# Patient Record
Sex: MALE | Race: WHITE | NOT HISPANIC OR LATINO | Employment: UNEMPLOYED | URBAN - METROPOLITAN AREA
[De-identification: names, ages, dates, MRNs, and addresses within clinical notes are randomized per-mention and may not be internally consistent; named-entity substitution may affect disease eponyms.]

---

## 2017-01-03 RX ORDER — TESTOSTERONE CYPIONATE 200 MG/ML
INJECTION, SOLUTION INTRAMUSCULAR
Qty: 2 ML | Refills: 0 | OUTPATIENT
Start: 2017-01-03 | End: 2017-04-04 | Stop reason: SDUPTHER

## 2017-01-03 NOTE — TELEPHONE ENCOUNTER
----- Message from Alyssa Diaz sent at 1/3/2017  1:10 PM EST -----  Contact: patient  Patient needs a refill of testosterone sent to [facility], and syringes, 30 day supply    Patient also needs a refill of Somatropin 20 MG/2ML solution to a mail order pharmacy    Patient can be reached at his home phone number      [facility] WAS FAXED FOR SOMATROPIN AND CALLED IN TESTOSTERONE

## 2017-01-31 DIAGNOSIS — D35.2 BENIGN NEOPLASM OF PITUITARY GLAND (HCC): ICD-10-CM

## 2017-01-31 DIAGNOSIS — E27.40 CHRONIC ADRENAL INSUFFICIENCY (HCC): ICD-10-CM

## 2017-01-31 DIAGNOSIS — E27.9 DISORDER OF ADRENAL GLAND (HCC): Primary | ICD-10-CM

## 2017-02-02 ENCOUNTER — TELEPHONE (OUTPATIENT)
Dept: ENDOCRINOLOGY | Age: 32
End: 2017-02-02

## 2017-02-02 DIAGNOSIS — E27.40 CHRONIC ADRENAL INSUFFICIENCY (HCC): ICD-10-CM

## 2017-02-02 DIAGNOSIS — E29.1 HYPOGONADISM IN MALE: ICD-10-CM

## 2017-02-02 DIAGNOSIS — D35.2 BENIGN NEOPLASM OF PITUITARY GLAND (HCC): ICD-10-CM

## 2017-02-02 DIAGNOSIS — E03.8 OTHER SPECIFIED HYPOTHYROIDISM: ICD-10-CM

## 2017-02-02 DIAGNOSIS — E27.9 DISORDER OF ADRENAL GLAND (HCC): Primary | ICD-10-CM

## 2017-02-02 NOTE — TELEPHONE ENCOUNTER
----- Message from Melva Allen sent at 2/2/2017  2:16 PM EST -----  Contact: [facility]  [phone]  [id number]  Somatropin 20 MG/2ML solution 24 mL 1 1/31/2017       Inject 0.4 mg under the skin Daily    Insurance wont pay for 6 month supply  24 ml equivalent to 6 months    She can break down to 90 days with 3 refills    Please call pharmacy          Script sent for 90 day supply

## 2017-03-06 ENCOUNTER — TELEPHONE (OUTPATIENT)
Dept: ENDOCRINOLOGY | Age: 32
End: 2017-03-06

## 2017-03-06 NOTE — TELEPHONE ENCOUNTER
Sent a pa via cover my meds for Nutropin, also called into [facility] for the pa.

## 2017-03-29 RX ORDER — TESTOSTERONE CYPIONATE 200 MG/ML
INJECTION, SOLUTION INTRAMUSCULAR
Refills: 0 | Status: CANCELLED | OUTPATIENT
Start: 2017-03-29

## 2017-04-04 RX ORDER — TESTOSTERONE CYPIONATE 200 MG/ML
INJECTION, SOLUTION INTRAMUSCULAR
Qty: 2 ML | Refills: 0 | OUTPATIENT
Start: 2017-04-04 | End: 2017-04-26 | Stop reason: SDUPTHER

## 2017-04-28 PROBLEM — E23.0 HYPOPITUITARISM (HCC): Status: ACTIVE | Noted: 2017-04-28

## 2017-05-03 RX ORDER — TESTOSTERONE CYPIONATE 200 MG/ML
INJECTION, SOLUTION INTRAMUSCULAR
Qty: 2 ML | Refills: 1 | Status: SHIPPED | OUTPATIENT
Start: 2017-05-03 | End: 2017-07-11 | Stop reason: SDUPTHER

## 2017-05-11 ENCOUNTER — TELEPHONE (OUTPATIENT)
Dept: ENDOCRINOLOGY | Age: 32
End: 2017-05-11

## 2017-07-13 ENCOUNTER — TELEPHONE (OUTPATIENT)
Dept: ENDOCRINOLOGY | Age: 32
End: 2017-07-13

## 2017-07-13 RX ORDER — TESTOSTERONE CYPIONATE 200 MG/ML
INJECTION, SOLUTION INTRAMUSCULAR
Qty: 2 ML | Refills: 1 | OUTPATIENT
Start: 2017-07-13 | End: 2017-10-23 | Stop reason: SDUPTHER

## 2017-07-13 NOTE — TELEPHONE ENCOUNTER
Testosterone cypionate  200 mg/mL  inject 1 Ml into shoulder thigh of buttocks every 2 weeks  qty 2mL with 1 refill was called into patient pharamcy

## 2017-11-01 RX ORDER — TESTOSTERONE CYPIONATE 200 MG/ML
INJECTION, SOLUTION INTRAMUSCULAR
Qty: 4 ML | Refills: 1 | OUTPATIENT
Start: 2017-11-01 | End: 2017-12-28 | Stop reason: SDUPTHER

## 2017-12-22 ENCOUNTER — OFFICE VISIT (OUTPATIENT)
Dept: ENDOCRINOLOGY | Age: 32
End: 2017-12-22

## 2017-12-22 VITALS
DIASTOLIC BLOOD PRESSURE: 106 MMHG | BODY MASS INDEX: 36.68 KG/M2 | WEIGHT: 295 LBS | HEART RATE: 100 BPM | SYSTOLIC BLOOD PRESSURE: 150 MMHG | HEIGHT: 75 IN

## 2017-12-22 DIAGNOSIS — D35.2 BENIGN NEOPLASM OF PITUITARY GLAND (HCC): ICD-10-CM

## 2017-12-22 DIAGNOSIS — E27.40 CHRONIC ADRENAL INSUFFICIENCY (HCC): ICD-10-CM

## 2017-12-22 DIAGNOSIS — E29.1 HYPOGONADISM IN MALE: ICD-10-CM

## 2017-12-22 DIAGNOSIS — E03.8 OTHER SPECIFIED HYPOTHYROIDISM: ICD-10-CM

## 2017-12-22 DIAGNOSIS — R63.5 ABNORMAL WEIGHT GAIN: ICD-10-CM

## 2017-12-22 DIAGNOSIS — E23.0 HYPOPITUITARISM (HCC): Primary | ICD-10-CM

## 2017-12-22 PROCEDURE — 99214 OFFICE O/P EST MOD 30 MIN: CPT | Performed by: INTERNAL MEDICINE

## 2017-12-22 RX ORDER — FLUTICASONE PROPIONATE 110 UG/1
AEROSOL, METERED RESPIRATORY (INHALATION)
COMMUNITY
Start: 2017-11-29

## 2017-12-22 NOTE — PROGRESS NOTES
32 y.o.    Patient Care Team:  Christiano Peterson MD as PCP - General (Family Medicine)    Chief Complaint:        F/U ADRENAL INSUFFICIENCY.  HYPOTHYROIDISM.  HYPOGONADISM MALE.  PITUITARY ADENOMA.  HERE TO DISCUSS LAB RESULTS.  C/O BRAIN FOG AND CHEST DISCOMFORT TROUBLE FOCUSING.     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  He reports compliance with the medication and denies any side effects  Adrenal insufficiency  Patient takes hydrocortisone 5 mg tablet 4 tablets daily.  He reports compliance with the medication and denies any side effects.  Hypogonadism  Patient is currently on testosterone injections 1 cc every 2 weeks  He denies any side effects and reports compliance of the medication  Abnormal weight gain  Patient has not lost any weight since last visit.       Interval History  patient is a 30-year-old white male with a past medical history of pituitary tumor status post surgery approximately 12 years ago came for followup.  Hypopituitarism patient is currently on growth hormone replacement injections daily he gets 0.7 mg daily subcutaneous injection self-administered.  Hypothyroidism patient is receiving 75 mcg levothyroxin daily.  Hypogonadism patient is on testosterone injections 200 mg intramuscular  every 2 weeks. He appears to be tolerating the medication well has no side effects reported.  Abnormal weight gain. Patient is currently taking metformin 500 mg twice daily and again denied any side effects.  Patient had an MRI last year and was seen by neurosurgery and was told that everything is fine and he needs a followup MRI sometime this year. Patient is unable to schedule this for high co-pay.  he was also advised to change his eyeglasses which he did and reports that he had significant improvement in his headache and vision problems  Patient reported that he had a colonoscopy within the past 2-3 years and apparently was normal. He reported family history of colon polyps.  I recommend that he get another colonoscopy within the next year.  Patient denied any side effects to medication, he also denied any back pain or hip pain.  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  Patient reported that he started low-carb diet approximately 6 months ago and since then managed to lose nearly 26 pounds        The following portions of the patient's history were reviewed and updated as appropriate: allergies, current medications, past family history, past medical history, past social history, past surgical history and problem list.    Past Medical History:   Diagnosis Date   • Adrenal insufficiency    • Asthma    • Hypogonadism in male    • Hypothyroidism    • Pituitary adenoma      Family History   Problem Relation Age of Onset   • Thyroid disease Father      Social History     Social History   • Marital status: Single     Spouse name: N/A   • Number of children: N/A   • Years of education: N/A     Occupational History   • Not on file.     Social History Main Topics   • Smoking status: Never Smoker   • Smokeless tobacco: Not on file   • Alcohol use Yes      Comment: SOCIAL   • Drug use: No   • Sexual activity: Not on file     Other Topics Concern   • Not on file     Social History  "Narrative     Allergies   Allergen Reactions   • Other        Current Outpatient Prescriptions:   •  hydrocortisone (CORTEF) 5 MG tablet, Take 1 tablet by mouth 4 (Four) Times a Day., Disp: 120 tablet, Rfl: 10  •  levothyroxine (SYNTHROID, LEVOTHROID) 75 MCG tablet, Take 1 tablet by mouth Daily., Disp: 30 tablet, Rfl: 10  •  meloxicam (MOBIC) 15 MG tablet, , Disp: , Rfl:   •  metFORMIN (GLUCOPHAGE) 500 MG tablet, TAKE ONE TABLET BY MOUTH TWICE DAILY WITH MEALS, Disp: 60 tablet, Rfl: 0  •  metFORMIN (GLUCOPHAGE) 500 MG tablet, Take 1 tablet by mouth 2 (Two) Times a Day With Meals., Disp: 60 tablet, Rfl: 0  •  Somatropin (OMNITROPE) 10 MG/1.5ML solution, Inject 0.4mg under skin daily, Disp: 12 mL, Rfl: 1  •  Testosterone Cypionate (DEPOTESTOTERONE CYPIONATE) 200 MG/ML injection, INJECT 1ML INTO THE SHOULDER, THIGH OR BUTTOCKS EVERY TWO WEEKS, Disp: 4 mL, Rfl: 1  •  VENTOLIN  (90 BASE) MCG/ACT inhaler, , Disp: , Rfl:         Review of Systems   Constitutional: Positive for fatigue. Negative for chills and fever.   Cardiovascular: Negative for chest pain and palpitations.   Gastrointestinal: Negative for abdominal pain, constipation, diarrhea, nausea and vomiting.   Endocrine: Negative for cold intolerance and heat intolerance.   All other systems reviewed and are negative.      Objective       Vitals:    12/22/17 1233   BP: (!) 150/106   Pulse: 100   Weight: 134 kg (295 lb)   Height: 190.5 cm (75\")     Body mass index is 36.87 kg/(m^2).      Physical Exam   Constitutional: He is oriented to person, place, and time. He appears well-developed and well-nourished.   obese   HENT:   Head: Normocephalic and atraumatic.   Eyes: EOM are normal. Pupils are equal, round, and reactive to light.   Neck: Normal range of motion. Neck supple. No thyromegaly present.   Cardiovascular: Normal rate, regular rhythm, normal heart sounds and intact distal pulses.    Pulmonary/Chest: Effort normal and breath sounds normal. "   Abdominal: Soft. Bowel sounds are normal. He exhibits distension. There is no tenderness.   Musculoskeletal: Normal range of motion. He exhibits no edema.   Dorsal kyphosis noted   Lymphadenopathy:     He has no cervical adenopathy.   Neurological: He is alert and oriented to person, place, and time. He has normal reflexes.   Skin: Skin is warm and dry. No rash noted.   Psychiatric: He has a normal mood and affect. His behavior is normal.   Nursing note and vitals reviewed.    Results Review:     I reviewed the patient's new clinical results.    Medical records reviewed  Summary:      Office Visit on 12/23/2016   Component Date Value Ref Range Status   • Cortisol 12/23/2016 15.7  ug/dL Final    Comment:                         Cortisol AM         6.2 - 19.4                          Cortisol PM         2.3 - 11.9     • ACTH 12/23/2016 5.3* 7.2 - 63.3 pg/mL Final    ACTH reference interval for samples collected between 7 and 10 AM.   • Free T4 12/23/2016 1.03  0.93 - 1.70 ng/dL Final   • TSH 12/23/2016 1.930  0.270 - 4.200 mIU/mL Final   • Prolactin 12/23/2016 19.1* 4.0 - 15.2 ng/mL Final   • Glucose 12/23/2016 87  65 - 99 mg/dL Final   • BUN 12/23/2016 10  6 - 20 mg/dL Final   • Creatinine 12/23/2016 0.94  0.76 - 1.27 mg/dL Final   • eGFR Non  Am 12/23/2016 94  >60 mL/min/1.73 Final   • eGFR African Am 12/23/2016 114  >60 mL/min/1.73 Final   • BUN/Creatinine Ratio 12/23/2016 10.6  7.0 - 25.0 Final   • Sodium 12/23/2016 141  136 - 145 mmol/L Final   • Potassium 12/23/2016 4.3  3.5 - 5.2 mmol/L Final   • Chloride 12/23/2016 99  98 - 107 mmol/L Final   • Total CO2 12/23/2016 25.7  22.0 - 29.0 mmol/L Final   • Calcium 12/23/2016 9.7  8.6 - 10.5 mg/dL Final   • Total Protein 12/23/2016 7.1  6.0 - 8.5 g/dL Final   • Albumin 12/23/2016 4.90  3.50 - 5.20 g/dL Final   • Globulin 12/23/2016 2.2  gm/dL Final   • A/G Ratio 12/23/2016 2.2  g/dL Final   • Total Bilirubin 12/23/2016 0.6  0.1 - 1.2 mg/dL Final   • Alkaline  Phosphatase 12/23/2016 52  39 - 117 U/L Final   • AST (SGOT) 12/23/2016 21  1 - 40 U/L Final   • ALT (SGPT) 12/23/2016 25  1 - 41 U/L Final   • Testosterone, Total 12/23/2016 820  348 - 1197 ng/dL Final   • Comment 12/23/2016 Comment   Final    Comment: Adult male reference interval is based on a population of lean males  up to 40 years old.     • Testosterone, Free 12/23/2016 13.7  8.7 - 25.1 pg/mL Final   • WBC 12/23/2016 5.63  4.50 - 10.70 10*3/mm3 Final   • RBC 12/23/2016 4.97  4.60 - 6.00 10*6/mm3 Final   • Hemoglobin 12/23/2016 14.6  13.7 - 17.6 g/dL Final   • Hematocrit 12/23/2016 43.9  40.4 - 52.2 % Final   • MCV 12/23/2016 88.3  79.8 - 96.2 fL Final   • MCH 12/23/2016 29.4  27.0 - 32.7 pg Final   • MCHC 12/23/2016 33.3  32.6 - 36.4 g/dL Final   • RDW 12/23/2016 12.9  11.5 - 14.5 % Final   • Platelets 12/23/2016 208  140 - 500 10*3/mm3 Final   • Neutrophil Rel % 12/23/2016 63.2  42.7 - 76.0 % Final   • Lymphocyte Rel % 12/23/2016 21.8  19.6 - 45.3 % Final   • Monocyte Rel % 12/23/2016 10.5  5.0 - 12.0 % Final   • Eosinophil Rel % 12/23/2016 4.1  0.3 - 6.2 % Final   • Basophil Rel % 12/23/2016 0.4  0.0 - 1.5 % Final   • Neutrophils Absolute 12/23/2016 3.56  1.90 - 8.10 10*3/mm3 Final   • Lymphocytes Absolute 12/23/2016 1.23  0.90 - 4.80 10*3/mm3 Final   • Monocytes Absolute 12/23/2016 0.59  0.20 - 1.20 10*3/mm3 Final   • Eosinophils Absolute 12/23/2016 0.23  0.00 - 0.70 10*3/mm3 Final   • Basophils Absolute 12/23/2016 0.02  0.00 - 0.20 10*3/mm3 Final   • Immature Granulocyte Rel % 12/23/2016 0.0  0.0 - 0.5 % Final   • Immature Grans Absolute 12/23/2016 0.00  0.00 - 0.03 10*3/mm3 Final   • 25 Hydroxy, Vitamin D 12/23/2016 23.8* 30.0 - 100.0 ng/mL Final    Comment: Reference Range for Total Vitamin D 25(OH)  Deficiency    <20.0 ng/mL  Insufficiency 21-29 ng/mL  Sufficiency    ng/mL  Toxicity      >100 ng/ml          Lab Results   Component Value Date    HGBA1C 5.1 12/22/2014     Lab Results   Component  Value Date    CREATININE 0.94 12/23/2016     Imaging Results (most recent)     None                Assessment and Plan:    Gilbert was seen today for adrenal problem, pituitary problem, hypothyroidism and hypogonadism.    Diagnoses and all orders for this visit:    Hypopituitarism  -     T4, Free  -     TSH  -     Prolactin  -     Testosterone, Free, Total  -     Comprehensive Metabolic Panel  -     Insulin-like Growth Factor  -     Cortisol  -     ACTH    Other specified hypothyroidism  -     T4, Free  -     TSH  -     Prolactin  -     Testosterone, Free, Total  -     Comprehensive Metabolic Panel  -     Insulin-like Growth Factor  -     Cortisol  -     ACTH    Abnormal weight gain  -     T4, Free  -     TSH  -     Prolactin  -     Testosterone, Free, Total  -     Comprehensive Metabolic Panel  -     Insulin-like Growth Factor  -     Cortisol  -     ACTH    Benign neoplasm of pituitary gland  -     T4, Free  -     TSH  -     Prolactin  -     Testosterone, Free, Total  -     Comprehensive Metabolic Panel  -     Insulin-like Growth Factor  -     Cortisol  -     ACTH    Chronic adrenal insufficiency  -     T4, Free  -     TSH  -     Prolactin  -     Testosterone, Free, Total  -     Comprehensive Metabolic Panel  -     Insulin-like Growth Factor  -     Cortisol  -     ACTH    Hypogonadism in male  -     T4, Free  -     TSH  -     Prolactin  -     Testosterone, Free, Total  -     Comprehensive Metabolic Panel  -     Insulin-like Growth Factor  -     Cortisol  -     ACTH    Other orders  -     Somatropin (OMNITROPE) 10 MG/1.5ML solution; Inject 0.4mg under skin daily    #1 hypopituitarism with growth hormone deficiency. Patient is currently taking growth hormone injections 0.5 mg daily. He denies any side effects from medication.  #2 hypothyroidism: Patient is currently taking 75 mcg levothyroxine daily  #3 hypogonadism patient is on 200 mg testosterone injections every 2 weeks and tolerating the medication well . Denied  "any side effects.  #4 adrenal insufficiency: Patient is currently on hydrocortisone 10 mg in the morning, 5 mg at noon, 5 mg at 5 PM.  #5 abnormal weight gain most likely multifactorial. I advised him to continue metformin 500 mg twice a day     Patient is gained some weight since last visit  He reports that he was not getting the growth hormone injections from the specialty pharmacy for the past one year  Apparently there was some problem with the prior authorization and related paperwork  Patient reports gaining weight and feeling short of breath and feeling very fatigued for the past 6 months he also reports significant brain fog in diminishing concentration  All are known to cut in growth hormone deficiency  In his case he definitely needs the growth hormone injections  The pediatric endocrinologist has documented all the tests and the results and he has been receiving his medication ever since his childhood     Patient will get lab testing done today  He will return to follow-up in one year    The total time spent for old record and lab review and face- to- face was more than 25 min of which greater than 15 min of time was spent on counseling the patient on recommended evaluation and treatment options, instructions for management/treatment and /or follow up  and importance of compliance with chosen management or treatment options    Oli Padilla MD. FACE    12/22/17      EMR Dragon / transcription disclaimer:     \"Dictated utilizing Dragon dictation\".         "

## 2017-12-26 LAB
ACTH PLAS-MCNC: 3.6 PG/ML (ref 7.2–63.3)
ALBUMIN SERPL-MCNC: 4.8 G/DL (ref 3.5–5.2)
ALBUMIN/GLOB SERPL: 1.8 G/DL
ALP SERPL-CCNC: 56 U/L (ref 39–117)
ALT SERPL-CCNC: 14 U/L (ref 1–41)
AST SERPL-CCNC: 14 U/L (ref 1–40)
BILIRUB SERPL-MCNC: 0.4 MG/DL (ref 0.1–1.2)
BUN SERPL-MCNC: 12 MG/DL (ref 6–20)
BUN/CREAT SERPL: 12.4 (ref 7–25)
CALCIUM SERPL-MCNC: 9.6 MG/DL (ref 8.6–10.5)
CHLORIDE SERPL-SCNC: 96 MMOL/L (ref 98–107)
CO2 SERPL-SCNC: 30 MMOL/L (ref 22–29)
CORTIS SERPL-MCNC: 4.7 UG/DL
CREAT SERPL-MCNC: 0.97 MG/DL (ref 0.76–1.27)
GLOBULIN SER CALC-MCNC: 2.7 GM/DL
GLUCOSE SERPL-MCNC: 80 MG/DL (ref 65–99)
IGF-I SERPL-MCNC: 66 NG/ML (ref 88–246)
POTASSIUM SERPL-SCNC: 4.2 MMOL/L (ref 3.5–5.2)
PROLACTIN SERPL-MCNC: 14.2 NG/ML (ref 4–15.2)
PROT SERPL-MCNC: 7.5 G/DL (ref 6–8.5)
SODIUM SERPL-SCNC: 139 MMOL/L (ref 136–145)
T4 FREE SERPL-MCNC: 1.2 NG/DL (ref 0.93–1.7)
TESTOST FREE SERPL-MCNC: 22.6 PG/ML (ref 8.7–25.1)
TESTOST SERPL-MCNC: 1086 NG/DL (ref 264–916)
TSH SERPL DL<=0.005 MIU/L-ACNC: 1.58 MIU/ML (ref 0.27–4.2)

## 2017-12-28 RX ORDER — TESTOSTERONE CYPIONATE 200 MG/ML
INJECTION, SOLUTION INTRAMUSCULAR
Qty: 1 ML | Refills: 5 | OUTPATIENT
Start: 2017-12-28 | End: 2018-03-06 | Stop reason: SDUPTHER

## 2018-01-08 DIAGNOSIS — E27.9 DISORDER OF ADRENAL GLAND (HCC): ICD-10-CM

## 2018-01-08 DIAGNOSIS — D35.2 BENIGN NEOPLASM OF PITUITARY GLAND (HCC): ICD-10-CM

## 2018-01-08 RX ORDER — HYDROCORTISONE 5 MG/1
TABLET ORAL
Qty: 120 TABLET | Refills: 10 | Status: SHIPPED | OUTPATIENT
Start: 2018-01-08 | End: 2018-07-27 | Stop reason: SDUPTHER

## 2018-01-08 RX ORDER — LEVOTHYROXINE SODIUM 0.07 MG/1
TABLET ORAL
Qty: 30 TABLET | Refills: 10 | Status: SHIPPED | OUTPATIENT
Start: 2018-01-08 | End: 2018-07-27 | Stop reason: SDUPTHER

## 2018-02-16 ENCOUNTER — TELEPHONE (OUTPATIENT)
Dept: ENDOCRINOLOGY | Age: 33
End: 2018-02-16

## 2018-02-16 NOTE — TELEPHONE ENCOUNTER
SPOKE WITH [facility] ABOUT PATIENT MEDICATION  THEY STATED THEY NEEDED [facility] FILLED OUT WITH OUT ANY KIND OF MISTAKES ON IT AND FAXED BACK TO THEM.

## 2018-03-09 RX ORDER — TESTOSTERONE CYPIONATE 200 MG/ML
INJECTION, SOLUTION INTRAMUSCULAR
Qty: 2 ML | Refills: 2 | OUTPATIENT
Start: 2018-03-09 | End: 2018-06-05 | Stop reason: SDUPTHER

## 2018-06-07 NOTE — TELEPHONE ENCOUNTER
LAST OFFICE VISIT AND LABS 12/17/17  PATIENT HAS APPOINTMENT FOR 7/27/18 RESCHEDULED FROM 6/15/18    LEW 6/7/18    SCRIPT CALLED TO PHARMACY

## 2018-06-09 RX ORDER — TESTOSTERONE CYPIONATE 200 MG/ML
INJECTION, SOLUTION INTRAMUSCULAR
Qty: 2 ML | Refills: 5 | Status: SHIPPED | OUTPATIENT
Start: 2018-06-09 | End: 2018-07-27 | Stop reason: SDUPTHER

## 2018-07-27 ENCOUNTER — OFFICE VISIT (OUTPATIENT)
Dept: ENDOCRINOLOGY | Age: 33
End: 2018-07-27

## 2018-07-27 VITALS
BODY MASS INDEX: 39.17 KG/M2 | SYSTOLIC BLOOD PRESSURE: 150 MMHG | WEIGHT: 315 LBS | HEIGHT: 75 IN | HEART RATE: 99 BPM | DIASTOLIC BLOOD PRESSURE: 110 MMHG

## 2018-07-27 DIAGNOSIS — D35.2 BENIGN NEOPLASM OF PITUITARY GLAND (HCC): ICD-10-CM

## 2018-07-27 DIAGNOSIS — E03.8 OTHER SPECIFIED HYPOTHYROIDISM: ICD-10-CM

## 2018-07-27 DIAGNOSIS — E27.9 DISORDER OF ADRENAL GLAND (HCC): ICD-10-CM

## 2018-07-27 DIAGNOSIS — E23.0 HYPOPITUITARISM (HCC): Primary | ICD-10-CM

## 2018-07-27 DIAGNOSIS — E29.1 HYPOGONADISM IN MALE: ICD-10-CM

## 2018-07-27 PROCEDURE — 99214 OFFICE O/P EST MOD 30 MIN: CPT | Performed by: INTERNAL MEDICINE

## 2018-07-27 RX ORDER — VENLAFAXINE HYDROCHLORIDE 75 MG/1
CAPSULE, EXTENDED RELEASE ORAL
COMMUNITY
Start: 2018-07-09

## 2018-07-27 RX ORDER — TESTOSTERONE CYPIONATE 200 MG/ML
INJECTION, SOLUTION INTRAMUSCULAR
Qty: 2 ML | Refills: 5 | Status: SHIPPED | OUTPATIENT
Start: 2018-07-27 | End: 2019-04-02 | Stop reason: SDUPTHER

## 2018-07-27 RX ORDER — LEVOTHYROXINE SODIUM 0.07 MG/1
75 TABLET ORAL DAILY
Qty: 90 TABLET | Refills: 3 | Status: SHIPPED | OUTPATIENT
Start: 2018-07-27 | End: 2018-08-03 | Stop reason: SDUPTHER

## 2018-07-27 RX ORDER — HYDROCORTISONE 5 MG/1
TABLET ORAL
Qty: 120 TABLET | Refills: 10 | Status: SHIPPED | OUTPATIENT
Start: 2018-07-27 | End: 2018-08-03 | Stop reason: SDUPTHER

## 2018-07-27 RX ORDER — LEVOTHYROXINE SODIUM 0.07 MG/1
75 TABLET ORAL DAILY
Qty: 30 TABLET | Refills: 10 | Status: SHIPPED | OUTPATIENT
Start: 2018-07-27 | End: 2018-07-27 | Stop reason: SDUPTHER

## 2018-07-30 LAB
25(OH)D3+25(OH)D2 SERPL-MCNC: 20.9 NG/ML (ref 30–100)
ACTH PLAS-MCNC: 5.6 PG/ML (ref 7.2–63.3)
ALBUMIN SERPL-MCNC: 4.7 G/DL (ref 3.5–5.2)
ALBUMIN/GLOB SERPL: 2.1 G/DL
ALP SERPL-CCNC: 46 U/L (ref 39–117)
ALT SERPL-CCNC: 60 U/L (ref 1–41)
AST SERPL-CCNC: 40 U/L (ref 1–40)
BILIRUB SERPL-MCNC: 0.3 MG/DL (ref 0.1–1.2)
BUN SERPL-MCNC: 10 MG/DL (ref 6–20)
BUN/CREAT SERPL: 13.2 (ref 7–25)
CALCIUM SERPL-MCNC: 9.9 MG/DL (ref 8.6–10.5)
CHLORIDE SERPL-SCNC: 98 MMOL/L (ref 98–107)
CO2 SERPL-SCNC: 24 MMOL/L (ref 22–29)
CORTIS SERPL-MCNC: 2 UG/DL
CREAT SERPL-MCNC: 0.76 MG/DL (ref 0.76–1.27)
GLOBULIN SER CALC-MCNC: 2.2 GM/DL
GLUCOSE SERPL-MCNC: 96 MG/DL (ref 65–99)
IGF-I SERPL-MCNC: 107 NG/ML (ref 88–246)
POTASSIUM SERPL-SCNC: 4.3 MMOL/L (ref 3.5–5.2)
PROLACTIN SERPL-MCNC: 11.2 NG/ML (ref 4–15.2)
PROT SERPL-MCNC: 6.9 G/DL (ref 6–8.5)
SHBG SERPL-SCNC: 32.6 NMOL/L (ref 16.5–55.9)
SODIUM SERPL-SCNC: 138 MMOL/L (ref 136–145)
T4 FREE SERPL-MCNC: 0.96 NG/DL (ref 0.93–1.7)
TESTOST FREE SERPL-MCNC: 11.7 PG/ML (ref 8.7–25.1)
TESTOST SERPL-MCNC: 690 NG/DL (ref 264–916)
TSH SERPL DL<=0.005 MIU/L-ACNC: 1.32 MIU/ML (ref 0.27–4.2)

## 2018-08-03 ENCOUNTER — TELEPHONE (OUTPATIENT)
Dept: ENDOCRINOLOGY | Age: 33
End: 2018-08-03

## 2018-08-03 DIAGNOSIS — D35.2 BENIGN NEOPLASM OF PITUITARY GLAND (HCC): ICD-10-CM

## 2018-08-03 DIAGNOSIS — E27.9 DISORDER OF ADRENAL GLAND (HCC): ICD-10-CM

## 2018-08-03 RX ORDER — LEVOTHYROXINE SODIUM 0.07 MG/1
75 TABLET ORAL DAILY
Qty: 90 TABLET | Refills: 3 | Status: SHIPPED | OUTPATIENT
Start: 2018-08-03 | End: 2019-03-01 | Stop reason: SDUPTHER

## 2018-08-03 RX ORDER — HYDROCORTISONE 5 MG/1
TABLET ORAL
Qty: 120 TABLET | Refills: 10 | Status: SHIPPED | OUTPATIENT
Start: 2018-08-03 | End: 2019-03-01 | Stop reason: SDUPTHER

## 2018-08-03 NOTE — TELEPHONE ENCOUNTER
----- Message from Mayela Cannon sent at 8/3/2018  1:55 PM EDT -----  MEDICATIONS WERE SENT TO [facility].  PLEASE RESEND FOLLOWING MEDS TO [id number] - Psychiatric [address] - [phone]  - [phone] FX.    Hydrocortisone (CORTEF) 5 MG tablet 2 TAB S IN AM AND 1 AT NOON AND EVENING  Levothyroxine (SYNTHROID, LEVOTHROID) 75 MCG tablet Take 1 tablet by mouth Daily.  MetFORMIN (GLUCOPHAGE) 500 MG tablet Take 1 tablet by mouth 2 (Two) Times a Day With Meals.    BEST # FOR PT -[phone]    [facility] SENT

## 2019-01-25 ENCOUNTER — RESULTS ENCOUNTER (OUTPATIENT)
Dept: ENDOCRINOLOGY | Age: 34
End: 2019-01-25

## 2019-01-25 DIAGNOSIS — E27.9 DISORDER OF ADRENAL GLAND (HCC): ICD-10-CM

## 2019-01-25 DIAGNOSIS — E03.8 OTHER SPECIFIED HYPOTHYROIDISM: ICD-10-CM

## 2019-01-25 DIAGNOSIS — E29.1 HYPOGONADISM IN MALE: ICD-10-CM

## 2019-01-25 DIAGNOSIS — E23.0 HYPOPITUITARISM (HCC): ICD-10-CM

## 2019-01-25 DIAGNOSIS — D35.2 BENIGN NEOPLASM OF PITUITARY GLAND (HCC): ICD-10-CM

## 2019-01-25 DIAGNOSIS — D35.2 BENIGN NEOPLASM OF PITUITARY GLAND (HCC): Primary | ICD-10-CM

## 2019-02-14 RX ORDER — TESTOSTERONE CYPIONATE 200 MG/ML
INJECTION, SOLUTION INTRAMUSCULAR
Refills: 1 | OUTPATIENT
Start: 2019-02-14

## 2019-02-25 LAB
25(OH)D3+25(OH)D2 SERPL-MCNC: 26.6 NG/ML (ref 30–100)
ACTH PLAS-MCNC: 9.3 PG/ML (ref 7.2–63.3)
ALBUMIN SERPL-MCNC: 4.7 G/DL (ref 3.5–5.2)
ALBUMIN/GLOB SERPL: 1.9 G/DL
ALP SERPL-CCNC: 46 U/L (ref 39–117)
ALT SERPL-CCNC: 56 U/L (ref 1–41)
AST SERPL-CCNC: 43 U/L (ref 1–40)
BILIRUB SERPL-MCNC: 0.5 MG/DL (ref 0.1–1.2)
BUN SERPL-MCNC: 13 MG/DL (ref 6–20)
BUN/CREAT SERPL: 15.1 (ref 7–25)
CALCIUM SERPL-MCNC: 10.3 MG/DL (ref 8.6–10.5)
CHLORIDE SERPL-SCNC: 97 MMOL/L (ref 98–107)
CO2 SERPL-SCNC: 26.4 MMOL/L (ref 22–29)
CORTIS AM PEAK SERPL-MCNC: 12.4 UG/DL (ref 6.2–19.4)
CREAT SERPL-MCNC: 0.86 MG/DL (ref 0.76–1.27)
GLOBULIN SER CALC-MCNC: 2.5 GM/DL
GLUCOSE SERPL-MCNC: 92 MG/DL (ref 65–99)
IGF-I SERPL-MCNC: 220 NG/ML (ref 88–246)
POTASSIUM SERPL-SCNC: 4.6 MMOL/L (ref 3.5–5.2)
PROLACTIN SERPL-MCNC: 15.3 NG/ML (ref 4–15.2)
PROT SERPL-MCNC: 7.2 G/DL (ref 6–8.5)
SHBG SERPL-SCNC: 33.1 NMOL/L (ref 16.5–55.9)
SODIUM SERPL-SCNC: 139 MMOL/L (ref 136–145)
T4 FREE SERPL-MCNC: 0.85 NG/DL (ref 0.93–1.7)
TESTOST FREE SERPL-MCNC: 6.2 PG/ML (ref 8.7–25.1)
TESTOST SERPL-MCNC: 515 NG/DL (ref 264–916)
TSH SERPL DL<=0.005 MIU/L-ACNC: 1.68 MIU/ML (ref 0.27–4.2)

## 2019-03-01 ENCOUNTER — OFFICE VISIT (OUTPATIENT)
Dept: ENDOCRINOLOGY | Age: 34
End: 2019-03-01

## 2019-03-01 VITALS
HEIGHT: 75 IN | SYSTOLIC BLOOD PRESSURE: 140 MMHG | HEART RATE: 100 BPM | DIASTOLIC BLOOD PRESSURE: 70 MMHG | WEIGHT: 315 LBS | BODY MASS INDEX: 39.17 KG/M2

## 2019-03-01 DIAGNOSIS — E27.9 DISORDER OF ADRENAL GLAND (HCC): ICD-10-CM

## 2019-03-01 DIAGNOSIS — R63.5 ABNORMAL WEIGHT GAIN: ICD-10-CM

## 2019-03-01 DIAGNOSIS — E23.0 HYPOPITUITARISM (HCC): ICD-10-CM

## 2019-03-01 DIAGNOSIS — E29.1 HYPOGONADISM IN MALE: ICD-10-CM

## 2019-03-01 DIAGNOSIS — E03.8 OTHER SPECIFIED HYPOTHYROIDISM: Primary | ICD-10-CM

## 2019-03-01 DIAGNOSIS — D35.2 BENIGN NEOPLASM OF PITUITARY GLAND (HCC): ICD-10-CM

## 2019-03-01 DIAGNOSIS — E27.40 CHRONIC ADRENAL INSUFFICIENCY (HCC): ICD-10-CM

## 2019-03-01 PROCEDURE — 99214 OFFICE O/P EST MOD 30 MIN: CPT | Performed by: INTERNAL MEDICINE

## 2019-03-01 RX ORDER — LEVOTHYROXINE SODIUM 0.07 MG/1
75 TABLET ORAL DAILY
Qty: 90 TABLET | Refills: 3 | Status: SHIPPED | OUTPATIENT
Start: 2019-03-01 | End: 2020-03-17

## 2019-03-01 RX ORDER — HYDROCORTISONE 5 MG/1
TABLET ORAL
Qty: 360 TABLET | Refills: 2 | Status: SHIPPED | OUTPATIENT
Start: 2019-03-01 | End: 2020-03-17

## 2019-03-01 NOTE — PROGRESS NOTES
33 y.o.    Patient Care Team:  Christiano Peterson MD as PCP - General (Family Medicine)    Chief Complaint:      F/U ADRENAL INSUFFICIENCY.  HYPOTHYROIDISM.  HYPOGONADISM MALE.  PITUITARY ADENOMA.  HERE TO DISCUSS LAB RESULTS.  Subjective     HPI   Patient is a 33-year-old white male with a past history of benign pituitary tumor status post surgery in early childhood came for follow-up    Hypopituitarism  Patient is currently on growth hormone injections.  He is getting 0.4 mg subcu daily  He reports compliance with injections  He is receiving a cartridge and an injector which is good for 3 weeks  Apparently prior authorization has been completed and he has no problem getting the supply  He reports significant improvement in his concentration and the brain fog has lifted  His exercise tolerance is improved as well  Hypothyroidism  Is currently taking levothyroxine 75 mcg daily.  He reports compliance with medication  Denies any side effects  Adrenal insufficiency  Patient is currently on hydrocortisone 10 mg in the morning 5 mg at lunch and dinner  He reports compliance of the medication and denies any side effects  Hypogonadism  Patient is currently taking testosterone injections 1 cc every 2 weeks  He denies any side effects from the injections  Reports compliance  Abnormal weight gain  Patient currently weighs 333 pounds with a BMI of 41.6  He attributes most of the weight gain due to lack of growth hormone therapy for 2 years due to insurance coverage issues      Interval History    patient is a 30-year-old white male with a past medical history of pituitary tumor status post surgery approximately 12 years ago came for followup.  Hypopituitarism patient is currently on growth hormone replacement injections daily he gets 0.7 mg daily subcutaneous injection self-administered.  Hypothyroidism patient is receiving 75 mcg levothyroxin daily.  Hypogonadism patient is on testosterone injections 200 mg  intramuscular every 2 weeks. He appears to be tolerating the medication well has no side effects reported.  Abnormal weight gain. Patient is currently taking metformin 500 mg twice daily and again denied any side effects.  Patient had an MRI last year and was seen by neurosurgery and was told that everything is fine and he needs a followup MRI sometime this year. Patient is unable to schedule this for high co-pay.  he was also advised to change his eyeglasses which he did and reports that he had significant improvement in his headache and vision problems  Patient reported that he had a colonoscopy within the past 2-3 years and apparently was normal. He reported family history of colon polyps.  I recommend that he get another colonoscopy within the next year.  Patient denied any side effects to medication, he also denied any back pain or hip pain.  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  Patient reported that he started low-carb diet approximately 6 months ago and since then managed to lose nearly 26 pounds         The following portions of the patient's history were reviewed and updated as appropriate: allergies, current medications, past family history, past medical history, past social history, past surgical history and problem list.    Past Medical History:   Diagnosis Date   • Adrenal insufficiency (CMS/HCC)    • Asthma    • Hypogonadism in male    • Hypothyroidism    • Pituitary adenoma (CMS/HCC)      Family History   Problem Relation Age of Onset   • Thyroid disease Father      Social History     Socioeconomic History   • Marital status: Single     Spouse name: Not on file   • Number of children: Not on file   • Years of education: Not on file   • Highest education level: Not on file   Social Needs   • Financial resource strain: Not on file   • Food insecurity - worry: Not on file   • Food insecurity - inability: Not on file   • Transportation needs - medical: Not on  "file   • Transportation needs - non-medical: Not on file   Occupational History   • Not on file   Tobacco Use   • Smoking status: Never Smoker   • Smokeless tobacco: Never Used   Substance and Sexual Activity   • Alcohol use: Yes     Comment: SOCIAL   • Drug use: No   • Sexual activity: Not on file   Other Topics Concern   • Not on file   Social History Narrative   • Not on file     Allergies   Allergen Reactions   • Other        Current Outpatient Medications:   •  fluticasone (FLOVENT HFA) 110 MCG/ACT inhaler, INHALE TWO PUFFS BY MOUTH TWICE DAILY, Disp: , Rfl:   •  hydrocortisone (CORTEF) 5 MG tablet, 2 TAB S IN AM AND 1 AT NOON AND EVENING, Disp: 120 tablet, Rfl: 10  •  levothyroxine (SYNTHROID, LEVOTHROID) 75 MCG tablet, Take 1 tablet by mouth Daily., Disp: 90 tablet, Rfl: 3  •  meloxicam (MOBIC) 15 MG tablet, , Disp: , Rfl:   •  metFORMIN (GLUCOPHAGE) 500 MG tablet, Take 1 tablet by mouth 2 (Two) Times a Day With Meals., Disp: 60 tablet, Rfl: 10  •  Somatropin (OMNITROPE) 10 MG/1.5ML solution, Inject 0.4mg under skin daily, Disp: 12 mL, Rfl: 3  •  Testosterone Cypionate (DEPOTESTOTERONE CYPIONATE) 200 MG/ML injection, 1 ML IM EVERY 2 WEEKS, Disp: 2 mL, Rfl: 5  •  venlafaxine XR (EFFEXOR-XR) 75 MG 24 hr capsule, TAKE 1 CAPSULE BY MOUTH ONCE DAILY, Disp: , Rfl:   •  VENTOLIN  (90 BASE) MCG/ACT inhaler, , Disp: , Rfl:         Review of Systems   Constitutional: Negative for chills, fatigue and fever.   Cardiovascular: Negative for chest pain.   Gastrointestinal: Negative for abdominal pain, constipation, diarrhea, nausea and vomiting.   Endocrine: Negative for cold intolerance and heat intolerance.   All other systems reviewed and are negative.      Objective       Vitals:    03/01/19 1217   BP: 140/70   Pulse: 100   Weight: (!) 151 kg (333 lb)   Height: 190.5 cm (75\")     Body mass index is 41.62 kg/m².      Physical Exam   Constitutional: He is oriented to person, place, and time. He appears " well-developed and well-nourished.   obese   HENT:   Head: Normocephalic and atraumatic.   Eyes: EOM are normal. Pupils are equal, round, and reactive to light.   Neck: Normal range of motion. Neck supple. No thyromegaly present.   Cardiovascular: Normal rate, regular rhythm, normal heart sounds and intact distal pulses.   Pulmonary/Chest: Effort normal and breath sounds normal.   Abdominal: Soft. Bowel sounds are normal. He exhibits distension. There is no tenderness.   Musculoskeletal: Normal range of motion. He exhibits no edema.   Dorsal kyphosis noted   Lymphadenopathy:     He has no cervical adenopathy.   Neurological: He is alert and oriented to person, place, and time. He has normal reflexes.   Skin: Skin is warm and dry. No rash noted.   Psychiatric: He has a normal mood and affect. His behavior is normal.   Nursing note and vitals reviewed.    Results Review:     I reviewed the patient's new clinical results.    Medical records reviewed  Summary:      Results Encounter on 01/25/2019   Component Date Value Ref Range Status   • ACTH 02/22/2019 9.3  7.2 - 63.3 pg/mL Final    ACTH reference interval for samples collected between 7 and 10 AM.   • Cortisol - AM 02/22/2019 12.4  6.2 - 19.4 ug/dL Final   • Insulin-Like Growth Factor-1 02/22/2019 220  88 - 246 ng/mL Final   • Glucose 02/22/2019 92  65 - 99 mg/dL Final   • BUN 02/22/2019 13  6 - 20 mg/dL Final   • Creatinine 02/22/2019 0.86  0.76 - 1.27 mg/dL Final   • eGFR Non African Am 02/22/2019 103  >60 mL/min/1.73 Final   • eGFR African Am 02/22/2019 125  >60 mL/min/1.73 Final   • BUN/Creatinine Ratio 02/22/2019 15.1  7.0 - 25.0 Final   • Sodium 02/22/2019 139  136 - 145 mmol/L Final   • Potassium 02/22/2019 4.6  3.5 - 5.2 mmol/L Final   • Chloride 02/22/2019 97* 98 - 107 mmol/L Final   • Total CO2 02/22/2019 26.4  22.0 - 29.0 mmol/L Final   • Calcium 02/22/2019 10.3  8.6 - 10.5 mg/dL Final   • Total Protein 02/22/2019 7.2  6.0 - 8.5 g/dL Final   • Albumin  02/22/2019 4.70  3.50 - 5.20 g/dL Final   • Globulin 02/22/2019 2.5  gm/dL Final   • A/G Ratio 02/22/2019 1.9  g/dL Final   • Total Bilirubin 02/22/2019 0.5  0.1 - 1.2 mg/dL Final   • Alkaline Phosphatase 02/22/2019 46  39 - 117 U/L Final   • AST (SGOT) 02/22/2019 43* 1 - 40 U/L Final   • ALT (SGPT) 02/22/2019 56* 1 - 41 U/L Final   • Testosterone, Total 02/22/2019 515  264 - 916 ng/dL Final    Comment: Adult male reference interval is based on a population of  healthy nonobese males (BMI <30) between 19 and 39 years old.  Bandar et.al. JCEM 2017,102;6139-6165. PMID: [id number].     • Testosterone, Free 02/22/2019 6.2* 8.7 - 25.1 pg/mL Final   • Sex Hormone Binding Globulin 02/22/2019 33.1  16.5 - 55.9 nmol/L Final   • Prolactin 02/22/2019 15.3* 4.0 - 15.2 ng/mL Final   • Free T4 02/22/2019 0.85* 0.93 - 1.70 ng/dL Final   • TSH 02/22/2019 1.680  0.270 - 4.200 mIU/mL Final   • 25 Hydroxy, Vitamin D 02/22/2019 26.6* 30.0 - 100.0 ng/ml Final    Comment: Reference Range for Total Vitamin D 25(OH)  Deficiency <20.0 ng/mL  Insufficiency 21-29 ng/mL  Sufficiency  ng/mL  Toxicity >100 ng/ml       Lab Results   Component Value Date    HGBA1C 5.1 12/22/2014     Lab Results   Component Value Date    CREATININE 0.86 02/22/2019     Imaging Results (most recent)     None                Assessment and Plan:    Gilbert was seen today for hypothyroidism, adrenal problem, hypogonadism and pituitary adenoma.    Diagnoses and all orders for this visit:    Other specified hypothyroidism    Benign neoplasm of pituitary gland (CMS/HCC)  -     hydrocortisone (CORTEF) 5 MG tablet; 2 TAB S IN AM AND 1 AT NOON AND EVENING    Disorder of adrenal gland (CMS/HCC)  -     hydrocortisone (CORTEF) 5 MG tablet; 2 TAB S IN AM AND 1 AT NOON AND EVENING    Hypogonadism in male    Hypopituitarism (CMS/HCC)    Chronic adrenal insufficiency (CMS/HCC)    Abnormal weight gain    Other orders  -     Testosterone Enanthate (XYOSTED) 75 MG/0.5ML solution  "auto-injector; Inject 75 mg under the skin into the appropriate area as directed Every 7 (Seven) Days.  -     Somatropin (OMNITROPE) 10 MG/1.5ML solution; Inject 0.4mg under skin daily  -     metFORMIN (GLUCOPHAGE) 500 MG tablet; Take 1 tablet by mouth 2 (Two) Times a Day With Meals.  -     levothyroxine (SYNTHROID, LEVOTHROID) 75 MCG tablet; Take 1 tablet by mouth Daily.    Patient denied any new symptoms  Lab reports reviewed  All the labs appears stable  IGF-I is 220 slightly higher  Patient is currently taking somatropin 0.4 mg subcu daily  If the next IGF-I level is higher than currently I may decrease the dose  I discussed the use of subcutaneous testosterone injections weekly with the patient  Patient was given a prescription and a discount card    Patient will return to follow-up in 6 months    The total time spent  was more than 25 min of which greater than 15 min of time (greater than 50% of the total time)  was spent face to face with the patient counseling and coordination of care on recommended evaluation and treatment options, instructions for management/treatment and /or follow up and importance of compliance with chosen management or treatment options  Oli Padilla MD. FACE    03/01/19      EMR Dragon / transcription disclaimer:     \"Dictated utilizing Dragon dictation\".         "

## 2019-03-15 ENCOUNTER — PRIOR AUTHORIZATION (OUTPATIENT)
Dept: ENDOCRINOLOGY | Age: 34
End: 2019-03-15

## 2019-03-15 NOTE — TELEPHONE ENCOUNTER
PA Denied   Xyosted 75MG/0.5ML SC SOAJ  Has to have tried two preferred medications - testosterone cypionate, testosterone enanthate solution 200Mg/Ml intramuscular, testosterone gel 1% packet or testosterone TD   03-

## 2019-03-28 ENCOUNTER — TELEPHONE (OUTPATIENT)
Dept: ENDOCRINOLOGY | Age: 34
End: 2019-03-28

## 2019-03-28 NOTE — TELEPHONE ENCOUNTER
----- Message from Perlita Steven sent at 3/15/2019  8:57 AM EDT -----  Contact: ANTHONY CRUZ Denied   Xyosted 75MG/0.5ML SC SOAJ  Has to have tried two preferred medications - testosterone cypionate, testosterone enanthate solution 200Mg/Ml intramuscular, testosterone gel 1% packet or testosterone TD   03-     CHARTED

## 2019-04-17 RX ORDER — TESTOSTERONE CYPIONATE 200 MG/ML
INJECTION, SOLUTION INTRAMUSCULAR
Qty: 2 ML | Refills: 5 | Status: SHIPPED | OUTPATIENT
Start: 2019-04-17 | End: 2019-10-24 | Stop reason: SDUPTHER

## 2019-08-21 ENCOUNTER — PRIOR AUTHORIZATION (OUTPATIENT)
Dept: ENDOCRINOLOGY | Age: 34
End: 2019-08-21

## 2019-10-25 RX ORDER — TESTOSTERONE CYPIONATE 200 MG/ML
INJECTION, SOLUTION INTRAMUSCULAR
Qty: 2 ML | Refills: 5 | Status: SHIPPED | OUTPATIENT
Start: 2019-10-25 | End: 2020-05-28 | Stop reason: SDUPTHER

## 2019-11-04 RX ORDER — TESTOSTERONE CYPIONATE 200 MG/ML
INJECTION, SOLUTION INTRAMUSCULAR
Refills: 5 | OUTPATIENT
Start: 2019-11-04

## 2019-11-22 ENCOUNTER — LAB (OUTPATIENT)
Dept: ENDOCRINOLOGY | Age: 34
End: 2019-11-22

## 2019-11-22 DIAGNOSIS — E03.8 OTHER SPECIFIED HYPOTHYROIDISM: ICD-10-CM

## 2019-11-22 DIAGNOSIS — D35.2 BENIGN NEOPLASM OF PITUITARY GLAND (HCC): Primary | ICD-10-CM

## 2019-11-22 DIAGNOSIS — E29.1 HYPOGONADISM IN MALE: ICD-10-CM

## 2019-11-22 DIAGNOSIS — E27.9 DISORDER OF ADRENAL GLAND (HCC): ICD-10-CM

## 2019-11-22 DIAGNOSIS — E27.40 CHRONIC ADRENAL INSUFFICIENCY (HCC): ICD-10-CM

## 2019-11-22 DIAGNOSIS — E23.0 HYPOPITUITARISM (HCC): ICD-10-CM

## 2019-11-22 DIAGNOSIS — D35.2 BENIGN NEOPLASM OF PITUITARY GLAND (HCC): ICD-10-CM

## 2019-11-25 LAB
1,25(OH)2D3 SERPL-MCNC: 39.9 PG/ML (ref 19.9–79.3)
ACTH PLAS-MCNC: 6.5 PG/ML (ref 7.2–63.3)
ALBUMIN SERPL-MCNC: 4.7 G/DL (ref 3.5–5.2)
ALBUMIN/GLOB SERPL: 2 G/DL
ALP SERPL-CCNC: 45 U/L (ref 39–117)
ALT SERPL-CCNC: 48 U/L (ref 1–41)
AST SERPL-CCNC: 27 U/L (ref 1–40)
BILIRUB SERPL-MCNC: 0.5 MG/DL (ref 0.2–1.2)
BUN SERPL-MCNC: 9 MG/DL (ref 6–20)
BUN/CREAT SERPL: 9.4 (ref 7–25)
CALCIUM SERPL-MCNC: 9.5 MG/DL (ref 8.6–10.5)
CHLORIDE SERPL-SCNC: 99 MMOL/L (ref 98–107)
CO2 SERPL-SCNC: 27 MMOL/L (ref 22–29)
CORTIS AM PEAK SERPL-MCNC: 17.5 UG/DL (ref 6.2–19.4)
CREAT SERPL-MCNC: 0.96 MG/DL (ref 0.76–1.27)
GLOBULIN SER CALC-MCNC: 2.4 GM/DL
GLUCOSE SERPL-MCNC: 89 MG/DL (ref 65–99)
IGF-I SERPL-MCNC: 98 NG/ML (ref 88–246)
Lab: NORMAL
POTASSIUM SERPL-SCNC: 4.2 MMOL/L (ref 3.5–5.2)
PROLACTIN SERPL-MCNC: 16.3 NG/ML (ref 4–15.2)
PROT SERPL-MCNC: 7.1 G/DL (ref 6–8.5)
SHBG SERPL-SCNC: 31.5 NMOL/L (ref 16.5–55.9)
SODIUM SERPL-SCNC: 139 MMOL/L (ref 136–145)
T4 FREE SERPL-MCNC: 0.69 NG/DL (ref 0.93–1.7)
TESTOST FREE SERPL-MCNC: 5.8 PG/ML (ref 8.7–25.1)
TESTOST SERPL-MCNC: 414 NG/DL (ref 264–916)
TSH SERPL DL<=0.005 MIU/L-ACNC: 1.77 UIU/ML (ref 0.27–4.2)

## 2020-03-16 DIAGNOSIS — E27.9 DISORDER OF ADRENAL GLAND (HCC): ICD-10-CM

## 2020-03-16 DIAGNOSIS — D35.2 BENIGN NEOPLASM OF PITUITARY GLAND (HCC): ICD-10-CM

## 2020-03-17 RX ORDER — LEVOTHYROXINE SODIUM 0.07 MG/1
TABLET ORAL
Qty: 30 TABLET | Refills: 0 | Status: SHIPPED | OUTPATIENT
Start: 2020-03-17 | End: 2020-04-21

## 2020-03-17 RX ORDER — HYDROCORTISONE 5 MG/1
TABLET ORAL
Qty: 120 TABLET | Refills: 0 | Status: SHIPPED | OUTPATIENT
Start: 2020-03-17 | End: 2020-04-21

## 2020-04-21 DIAGNOSIS — D35.2 BENIGN NEOPLASM OF PITUITARY GLAND (HCC): ICD-10-CM

## 2020-04-21 DIAGNOSIS — E27.9 DISORDER OF ADRENAL GLAND (HCC): ICD-10-CM

## 2020-04-21 RX ORDER — HYDROCORTISONE 5 MG/1
TABLET ORAL
Qty: 120 TABLET | Refills: 0 | Status: SHIPPED | OUTPATIENT
Start: 2020-04-21 | End: 2020-05-28 | Stop reason: SDUPTHER

## 2020-04-21 RX ORDER — LEVOTHYROXINE SODIUM 0.07 MG/1
TABLET ORAL
Qty: 30 TABLET | Refills: 0 | Status: SHIPPED | OUTPATIENT
Start: 2020-04-21 | End: 2020-05-28 | Stop reason: CLARIF

## 2020-05-26 DIAGNOSIS — E27.9 DISORDER OF ADRENAL GLAND (HCC): ICD-10-CM

## 2020-05-26 DIAGNOSIS — E29.1 HYPOGONADISM IN MALE: Primary | ICD-10-CM

## 2020-05-26 DIAGNOSIS — D35.2 BENIGN NEOPLASM OF PITUITARY GLAND (HCC): ICD-10-CM

## 2020-05-27 RX ORDER — TESTOSTERONE CYPIONATE 200 MG/ML
INJECTION, SOLUTION INTRAMUSCULAR
Qty: 2 ML | Refills: 0 | OUTPATIENT
Start: 2020-05-27

## 2020-05-27 RX ORDER — LEVOTHYROXINE SODIUM 75 UG/1
TABLET ORAL
Qty: 30 TABLET | Refills: 0 | OUTPATIENT
Start: 2020-05-27

## 2020-05-27 RX ORDER — HYDROCORTISONE 5 MG/1
TABLET ORAL
Qty: 120 TABLET | Refills: 0 | OUTPATIENT
Start: 2020-05-27

## 2020-05-28 DIAGNOSIS — D35.2 BENIGN NEOPLASM OF PITUITARY GLAND (HCC): ICD-10-CM

## 2020-05-28 DIAGNOSIS — E27.9 DISORDER OF ADRENAL GLAND (HCC): ICD-10-CM

## 2020-05-28 RX ORDER — TESTOSTERONE CYPIONATE 200 MG/ML
INJECTION, SOLUTION INTRAMUSCULAR
Qty: 2 ML | Refills: 5 | Status: SHIPPED | OUTPATIENT
Start: 2020-05-28 | End: 2020-06-30

## 2020-05-28 RX ORDER — HYDROCORTISONE 5 MG/1
TABLET ORAL
Qty: 120 TABLET | Refills: 0 | Status: SHIPPED | OUTPATIENT
Start: 2020-05-28 | End: 2020-06-30

## 2020-05-28 RX ORDER — LEVOTHYROXINE SODIUM 0.07 MG/1
75 TABLET ORAL DAILY
Qty: 30 TABLET | Refills: 0 | Status: SHIPPED | OUTPATIENT
Start: 2020-05-28 | End: 2020-06-30

## 2020-06-30 DIAGNOSIS — D35.2 BENIGN NEOPLASM OF PITUITARY GLAND (HCC): ICD-10-CM

## 2020-06-30 DIAGNOSIS — E27.9 DISORDER OF ADRENAL GLAND (HCC): ICD-10-CM

## 2020-06-30 RX ORDER — HYDROCORTISONE 5 MG/1
TABLET ORAL
Qty: 120 TABLET | Refills: 0 | Status: SHIPPED | OUTPATIENT
Start: 2020-06-30 | End: 2020-08-03 | Stop reason: SDUPTHER

## 2020-06-30 RX ORDER — LEVOTHYROXINE SODIUM 75 UG/1
TABLET ORAL
Qty: 30 TABLET | Refills: 0 | Status: SHIPPED | OUTPATIENT
Start: 2020-06-30 | End: 2020-07-29

## 2020-06-30 RX ORDER — TESTOSTERONE CYPIONATE 200 MG/ML
INJECTION, SOLUTION INTRAMUSCULAR
Qty: 2 ML | Refills: 0 | Status: SHIPPED | OUTPATIENT
Start: 2020-06-30

## 2020-07-29 RX ORDER — LEVOTHYROXINE SODIUM 75 UG/1
TABLET ORAL
Qty: 30 TABLET | Refills: 0 | Status: SHIPPED | OUTPATIENT
Start: 2020-07-29

## 2020-08-03 DIAGNOSIS — E27.9 DISORDER OF ADRENAL GLAND (HCC): ICD-10-CM

## 2020-08-03 DIAGNOSIS — D35.2 BENIGN NEOPLASM OF PITUITARY GLAND (HCC): ICD-10-CM

## 2020-08-03 RX ORDER — HYDROCORTISONE 5 MG/1
TABLET ORAL
Qty: 120 TABLET | Refills: 1 | Status: SHIPPED | OUTPATIENT
Start: 2020-08-03 | End: 2020-08-10 | Stop reason: SDUPTHER

## 2020-08-10 DIAGNOSIS — D35.2 BENIGN NEOPLASM OF PITUITARY GLAND (HCC): ICD-10-CM

## 2020-08-10 DIAGNOSIS — E27.9 DISORDER OF ADRENAL GLAND (HCC): ICD-10-CM

## 2020-08-10 RX ORDER — HYDROCORTISONE 5 MG/1
TABLET ORAL
Qty: 120 TABLET | Refills: 1 | Status: SHIPPED | OUTPATIENT
Start: 2020-08-10

## 2020-08-10 RX ORDER — HYDROCORTISONE 5 MG/1
TABLET ORAL
Qty: 120 TABLET | Refills: 1 | Status: SHIPPED | OUTPATIENT
Start: 2020-08-10 | End: 2020-08-10 | Stop reason: SDUPTHER